# Patient Record
Sex: FEMALE | Race: WHITE | NOT HISPANIC OR LATINO
[De-identification: names, ages, dates, MRNs, and addresses within clinical notes are randomized per-mention and may not be internally consistent; named-entity substitution may affect disease eponyms.]

---

## 2019-03-26 PROBLEM — Z00.00 ENCOUNTER FOR PREVENTIVE HEALTH EXAMINATION: Status: ACTIVE | Noted: 2019-03-26

## 2019-05-07 ENCOUNTER — APPOINTMENT (OUTPATIENT)
Dept: PLASTIC SURGERY | Facility: CLINIC | Age: 54
End: 2019-05-07
Payer: MEDICAID

## 2019-05-07 VITALS
RESPIRATION RATE: 16 BRPM | BODY MASS INDEX: 21.62 KG/M2 | DIASTOLIC BLOOD PRESSURE: 80 MMHG | SYSTOLIC BLOOD PRESSURE: 127 MMHG | HEIGHT: 60.5 IN | HEART RATE: 89 BPM | WEIGHT: 113.03 LBS | OXYGEN SATURATION: 100 % | TEMPERATURE: 98.6 F

## 2019-05-07 PROCEDURE — XXXXX: CPT

## 2019-05-07 NOTE — PHYSICAL EXAM
[NI] : Normal [de-identified] : Abdomen soft, not distended, non tender, no masses, no diastasis. \par \par HB was present during exam.

## 2019-05-07 NOTE — HISTORY OF PRESENT ILLNESS
[FreeTextEntry1] : 52 y/o woman presents for initial consultation for an excess of skin on her lower abdomen at her  scar that she has had for many years. \par \par She c/o repeated bouts of infections and peeling, burning rashes underneath this skin flap that is exacerbated with increased activity, including exercising. \par She has tried to alleviate her symptoms with oral abx and cortisone cream and other topical treatments all throughout last summer. \par She reports her weight has been stable for over one year. \par \par Patient denies history of smoking. \par She is not on blood thinners.\par Occupation: office work \par She denies any heart/lung problems or previous blood clots. \par Surgical hx significant for two C-sections.

## 2019-05-07 NOTE — ADDENDUM
[FreeTextEntry1] : I, El Cowan, acted solely as a scribe for Dr. Wilber Cavanaugh on this date 5/7/19.

## 2019-09-25 ENCOUNTER — APPOINTMENT (OUTPATIENT)
Dept: PLASTIC SURGERY | Facility: CLINIC | Age: 54
End: 2019-09-25

## 2019-09-27 ENCOUNTER — OUTPATIENT (OUTPATIENT)
Dept: OUTPATIENT SERVICES | Facility: HOSPITAL | Age: 54
LOS: 1 days | End: 2019-09-27
Payer: SELF-PAY

## 2019-09-27 VITALS
SYSTOLIC BLOOD PRESSURE: 104 MMHG | DIASTOLIC BLOOD PRESSURE: 65 MMHG | HEIGHT: 61 IN | WEIGHT: 114.64 LBS | HEART RATE: 63 BPM | RESPIRATION RATE: 20 BRPM | TEMPERATURE: 98 F

## 2019-09-27 DIAGNOSIS — Z98.891 HISTORY OF UTERINE SCAR FROM PREVIOUS SURGERY: Chronic | ICD-10-CM

## 2019-09-27 DIAGNOSIS — Z29.9 ENCOUNTER FOR PROPHYLACTIC MEASURES, UNSPECIFIED: ICD-10-CM

## 2019-09-27 DIAGNOSIS — L90.5 SCAR CONDITIONS AND FIBROSIS OF SKIN: ICD-10-CM

## 2019-09-27 DIAGNOSIS — Z01.818 ENCOUNTER FOR OTHER PREPROCEDURAL EXAMINATION: ICD-10-CM

## 2019-09-27 LAB
ANION GAP SERPL CALC-SCNC: 11 MMOL/L — SIGNIFICANT CHANGE UP (ref 5–17)
BASOPHILS # BLD AUTO: 0.02 K/UL — SIGNIFICANT CHANGE UP (ref 0–0.2)
BASOPHILS NFR BLD AUTO: 0.6 % — SIGNIFICANT CHANGE UP (ref 0–2)
BLD GP AB SCN SERPL QL: SIGNIFICANT CHANGE UP
BUN SERPL-MCNC: 15 MG/DL — SIGNIFICANT CHANGE UP (ref 8–20)
CALCIUM SERPL-MCNC: 9.5 MG/DL — SIGNIFICANT CHANGE UP (ref 8.6–10.2)
CHLORIDE SERPL-SCNC: 103 MMOL/L — SIGNIFICANT CHANGE UP (ref 98–107)
CO2 SERPL-SCNC: 24 MMOL/L — SIGNIFICANT CHANGE UP (ref 22–29)
CREAT SERPL-MCNC: 0.57 MG/DL — SIGNIFICANT CHANGE UP (ref 0.5–1.3)
EOSINOPHIL # BLD AUTO: 0.07 K/UL — SIGNIFICANT CHANGE UP (ref 0–0.5)
EOSINOPHIL NFR BLD AUTO: 2.1 % — SIGNIFICANT CHANGE UP (ref 0–6)
GLUCOSE SERPL-MCNC: 97 MG/DL — SIGNIFICANT CHANGE UP (ref 70–115)
HCT VFR BLD CALC: 38 % — SIGNIFICANT CHANGE UP (ref 34.5–45)
HGB BLD-MCNC: 12.7 G/DL — SIGNIFICANT CHANGE UP (ref 11.5–15.5)
IMM GRANULOCYTES NFR BLD AUTO: 0.3 % — SIGNIFICANT CHANGE UP (ref 0–1.5)
LYMPHOCYTES # BLD AUTO: 1.65 K/UL — SIGNIFICANT CHANGE UP (ref 1–3.3)
LYMPHOCYTES # BLD AUTO: 50 % — HIGH (ref 13–44)
MCHC RBC-ENTMCNC: 31.8 PG — SIGNIFICANT CHANGE UP (ref 27–34)
MCHC RBC-ENTMCNC: 33.4 GM/DL — SIGNIFICANT CHANGE UP (ref 32–36)
MCV RBC AUTO: 95 FL — SIGNIFICANT CHANGE UP (ref 80–100)
MONOCYTES # BLD AUTO: 0.31 K/UL — SIGNIFICANT CHANGE UP (ref 0–0.9)
MONOCYTES NFR BLD AUTO: 9.4 % — SIGNIFICANT CHANGE UP (ref 2–14)
NEUTROPHILS # BLD AUTO: 1.24 K/UL — LOW (ref 1.8–7.4)
NEUTROPHILS NFR BLD AUTO: 37.6 % — LOW (ref 43–77)
PLATELET # BLD AUTO: 209 K/UL — SIGNIFICANT CHANGE UP (ref 150–400)
POTASSIUM SERPL-MCNC: 4.4 MMOL/L — SIGNIFICANT CHANGE UP (ref 3.5–5.3)
POTASSIUM SERPL-SCNC: 4.4 MMOL/L — SIGNIFICANT CHANGE UP (ref 3.5–5.3)
RBC # BLD: 4 M/UL — SIGNIFICANT CHANGE UP (ref 3.8–5.2)
RBC # FLD: 12.4 % — SIGNIFICANT CHANGE UP (ref 10.3–14.5)
SODIUM SERPL-SCNC: 138 MMOL/L — SIGNIFICANT CHANGE UP (ref 135–145)
WBC # BLD: 3.3 K/UL — LOW (ref 3.8–10.5)
WBC # FLD AUTO: 3.3 K/UL — LOW (ref 3.8–10.5)

## 2019-09-27 PROCEDURE — 93005 ELECTROCARDIOGRAM TRACING: CPT

## 2019-09-27 PROCEDURE — G0463: CPT

## 2019-09-27 PROCEDURE — 93010 ELECTROCARDIOGRAM REPORT: CPT

## 2019-09-27 RX ORDER — CEFAZOLIN SODIUM 1 G
2000 VIAL (EA) INJECTION ONCE
Refills: 0 | Status: DISCONTINUED | OUTPATIENT
Start: 2019-10-11 | End: 2019-10-28

## 2019-09-27 RX ORDER — SODIUM CHLORIDE 9 MG/ML
3 INJECTION INTRAMUSCULAR; INTRAVENOUS; SUBCUTANEOUS ONCE
Refills: 0 | Status: DISCONTINUED | OUTPATIENT
Start: 2019-10-11 | End: 2019-10-28

## 2019-09-27 NOTE — H&P PST ADULT - NSANTHOSAYNRD_GEN_A_CORE
No. LUKE screening performed.  STOP BANG Legend: 0-2 = LOW Risk; 3-4 = INTERMEDIATE Risk; 5-8 = HIGH Risk

## 2019-09-27 NOTE — H&P PST ADULT - ASSESSMENT
53 y/o female  with no past medical hx seen today pre-op for excision of  section scar with liposuction for scar condition and fibrosis of skin. Pt report intermittent itching to site due to diaphoresis to site. Pt denies pain and discomfort to  site. Surgery protocol reviewed with pt today.  SHADII VTE 2.0 SCORE [CLOT updated 2019]    AGE RELATED RISK FACTORS                                                       MOBILITY RELATED FACTORS  [x ] Age 41-60 years                                            (1 Point)                    [ ] Bed rest                                                        (1 Point)  [ ] Age: 61-74 years                                           (2 Points)                  [ ] Plaster cast                                                   (2 Points)  [ ] Age= 75 years                                              (3 Points)                    [ ] Bed bound for more than 72 hours                 (2 Points)    DISEASE RELATED RISK FACTORS                                               GENDER SPECIFIC FACTORS  [ ] Edema in the lower extremities                       (1 Point)              [ ] Pregnancy                                                     (1 Point)  [ ] Varicose veins                                               (1 Point)                     [ ] Post-partum < 6 weeks                                   (1 Point)             [ ] BMI > 25 Kg/m2                                            (1 Point)                     [ ] Hormonal therapy  or oral contraception          (1 Point)                 [ ] Sepsis (in the previous month)                        (1 Point)               [ ] History of pregnancy complications                 (1 point)  [ ] Pneumonia or serious lung disease                                               [ ] Unexplained or recurrent                     (1 Point)           (in the previous month)                               (1 Point)  [ ] Abnormal pulmonary function test                     (1 Point)                 SURGERY RELATED RISK FACTORS  [ ] Acute myocardial infarction                              (1 Point)               [ ]  Section                                             (1 Point)  [ ] Congestive heart failure (in the previous month)  (1 Point)      [ ] Minor surgery                                                  (1 Point)   [ ] Inflammatory bowel disease                             (1 Point)               [ ] Arthroscopic surgery                                        (2 Points)  [ ] Central venous access                                      (2 Points)                [x ] General surgery lasting more than 45 minutes (2 points)  [ ] Malignancy- Present or previous                   (2 Points)                [ ] Elective arthroplasty                                         (5 points)    [ ] Stroke (in the previous month)                          (5 Points)                                                                                                                                                           HEMATOLOGY RELATED FACTORS                                                 TRAUMA RELATED RISK FACTORS  [ ] Prior episodes of VTE                                     (3 Points)                [ ] Fracture of the hip, pelvis, or leg                       (5 Points)  [ ] Positive family history for VTE                         (3 Points)             [ ] Acute spinal cord injury (in the previous month)  (5 Points)  [ ] Prothrombin 60756 A                                     (3 Points)               [ ] Paralysis  (less than 1 month)                             (5 Points)  [ ] Factor V Leiden                                             (3 Points)                  [ ] Multiple Trauma within 1 month                        (5 Points)  [ ] Lupus anticoagulants                                     (3 Points)                                                           [ ] Anticardiolipin antibodies                               (3 Points)                                                       [ ] High homocysteine in the blood                      (3 Points)                                             [ ] Other congenital or acquired thrombophilia      (3 Points)                                                [ ] Heparin induced thrombocytopenia                  (3 Points)                                     Total Score [   3       ]  OPIOID RISK TOOL    ELIZABETH EACH BOX THAT APPLIES AND ADD TOTALS AT THE END    FAMILY HISTORY OF SUBSTANCE ABUSE                 FEMALE         MALE                                                Alcohol                             [  ]1 pt          [  ]3pts                                               Illegal Durgs                     [  ]2 pts        [  ]3pts                                               Rx Drugs                           [  ]4 pts        [  ]4 pts    PERSONAL HISTORY OF SUBSTANCE ABUSE                                                                                          Alcohol                             [  ]3 pts       [  ]3 pts                                               Illegal Drugs                     [  ]4 pts        [  ]4 pts                                               Rx Drugs                           [  ]5 pts        [  ]5 pts    AGE BETWEEN 16-45 YEARS                                      [  ]1 pt         [  ]1 pt    HISTORY OF PREADOLESCENT   SEXUAL ABUSE                                                             [  ]3 pts        [  ]0pts    PSYCHOLOGICAL DISEASE                     ADD, OCD, Bipolar, Schizophrenia        [  ]2 pts         [  ]2 pts                      Depression                                               [  ]1 pt           [  ]1 pt           SCORING TOTAL   (add numbers and type here)              (**0*)                                     A score of 3 or lower indicated LOW risk for future opioid abuse  A score of 4 to 7 indicated moderate risk for future opioid abuse  A score of 8 or higher indicates a high risk for opioid abuse

## 2019-09-27 NOTE — H&P PST ADULT - HISTORY OF PRESENT ILLNESS
53 y/o female  with no past medical hx seen today pre-op for excision of  section scar with liposuction for scar condition and fibrosis of skin. Pt report intermittent itching to site due to diaphoresis to site. Pt denies pain and discomfort to  surgical site. Seen today for scheduled surgery.

## 2019-09-27 NOTE — H&P PST ADULT - NSICDXPROBLEM_GEN_ALL_CORE_FT
PROBLEM DIAGNOSES  Problem: Scar condition and fibrosis of skin  Assessment and Plan: Excision of  section scar with liposuction     Problem: Need for prophylactic measure  Assessment and Plan: Moderate risk, surgical team to determine prophylactic intervention

## 2019-10-10 ENCOUNTER — TRANSCRIPTION ENCOUNTER (OUTPATIENT)
Age: 54
End: 2019-10-10

## 2019-10-11 ENCOUNTER — OUTPATIENT (OUTPATIENT)
Dept: OUTPATIENT SERVICES | Facility: HOSPITAL | Age: 54
LOS: 1 days | End: 2019-10-11
Payer: SELF-PAY

## 2019-10-11 VITALS
OXYGEN SATURATION: 100 % | SYSTOLIC BLOOD PRESSURE: 106 MMHG | HEART RATE: 78 BPM | RESPIRATION RATE: 18 BRPM | DIASTOLIC BLOOD PRESSURE: 55 MMHG | TEMPERATURE: 97 F

## 2019-10-11 VITALS
OXYGEN SATURATION: 99 % | TEMPERATURE: 98 F | RESPIRATION RATE: 16 BRPM | SYSTOLIC BLOOD PRESSURE: 112 MMHG | WEIGHT: 114.64 LBS | DIASTOLIC BLOOD PRESSURE: 76 MMHG | HEART RATE: 70 BPM | HEIGHT: 61 IN

## 2019-10-11 DIAGNOSIS — L90.5 SCAR CONDITIONS AND FIBROSIS OF SKIN: ICD-10-CM

## 2019-10-11 DIAGNOSIS — Z98.891 HISTORY OF UTERINE SCAR FROM PREVIOUS SURGERY: Chronic | ICD-10-CM

## 2019-10-11 PROCEDURE — 13101 CMPLX RPR TRUNK 2.6-7.5 CM: CPT | Mod: XS

## 2019-10-11 PROCEDURE — 14302 TIS TRNFR ADDL 30 SQ CM: CPT

## 2019-10-11 PROCEDURE — 15877 SUCTION LIPECTOMY TRUNK: CPT

## 2019-10-11 PROCEDURE — 14301 TIS TRNFR ANY 30.1-60 SQ CM: CPT

## 2019-10-11 PROCEDURE — 13102 CMPLX RPR TRUNK ADDL 5CM/<: CPT | Mod: XS

## 2019-10-11 RX ORDER — SCOPALAMINE 1 MG/3D
1 PATCH, EXTENDED RELEASE TRANSDERMAL ONCE
Refills: 0 | Status: COMPLETED | OUTPATIENT
Start: 2019-10-11 | End: 2019-10-11

## 2019-10-11 RX ORDER — CEPHALEXIN 500 MG
1 CAPSULE ORAL
Qty: 8 | Refills: 0
Start: 2019-10-11 | End: 2019-10-12

## 2019-10-11 RX ORDER — PROGESTERONE 200 MG/1
0 CAPSULE, LIQUID FILLED ORAL
Qty: 0 | Refills: 0 | DISCHARGE

## 2019-10-11 RX ORDER — KETOROLAC TROMETHAMINE 30 MG/ML
30 SYRINGE (ML) INJECTION ONCE
Refills: 0 | Status: DISCONTINUED | OUTPATIENT
Start: 2019-10-11 | End: 2019-10-11

## 2019-10-11 RX ADMIN — SCOPALAMINE 1 PATCH: 1 PATCH, EXTENDED RELEASE TRANSDERMAL at 15:03

## 2019-10-11 RX ADMIN — Medication 30 MILLIGRAM(S): at 18:11

## 2019-10-11 NOTE — ASU DISCHARGE PLAN (ADULT/PEDIATRIC) - CALL YOUR DOCTOR IF YOU HAVE ANY OF THE FOLLOWING:
Wound/Surgical Site with redness, or foul smelling discharge or pus/Swelling that gets worse/Fever greater than (need to indicate Fahrenheit or Celsius)/Bleeding that does not stop/Nausea and vomiting that does not stop/Pain not relieved by Medications/Numbness, tingling, color or temperature change to extremity

## 2019-10-11 NOTE — ASU DISCHARGE PLAN (ADULT/PEDIATRIC) - CARE PROVIDER_API CALL
Wilber Cavanaugh)  Plastic Surgery; Surgery; Surgery of the Hand  [address], [address]  [place] [address]  Phone: [phone]  Fax: [phone]  Follow Up Time: 2 weeks

## 2019-10-14 PROBLEM — L90.5 SCAR CONDITIONS AND FIBROSIS OF SKIN: Chronic | Status: ACTIVE | Noted: 2019-09-27

## 2019-10-29 ENCOUNTER — APPOINTMENT (OUTPATIENT)
Dept: PLASTIC SURGERY | Facility: CLINIC | Age: 54
End: 2019-10-29
Payer: MEDICAID

## 2019-10-29 VITALS
BODY MASS INDEX: 22.58 KG/M2 | HEART RATE: 81 BPM | RESPIRATION RATE: 16 BRPM | TEMPERATURE: 97.5 F | HEIGHT: 60 IN | SYSTOLIC BLOOD PRESSURE: 124 MMHG | OXYGEN SATURATION: 100 % | WEIGHT: 115 LBS | DIASTOLIC BLOOD PRESSURE: 72 MMHG

## 2019-10-29 DIAGNOSIS — L90.5 SCAR CONDITIONS AND FIBROSIS OF SKIN: ICD-10-CM

## 2019-10-29 DIAGNOSIS — Z98.890 OTHER SPECIFIED POSTPROCEDURAL STATES: ICD-10-CM

## 2019-10-29 PROCEDURE — 99024 POSTOP FOLLOW-UP VISIT: CPT

## 2019-12-03 ENCOUNTER — APPOINTMENT (OUTPATIENT)
Dept: PLASTIC SURGERY | Facility: CLINIC | Age: 54
End: 2019-12-03

## 2020-03-29 NOTE — PHYSICAL EXAM
[Normal Breath Sounds] : Normal breath sounds [JVD] : no jugular venous distention  [Purpura] : no purpura  [de-identified] : well nourished [de-identified] : SUSHMA MUNSON [de-identified] : even and unlabored, no distress [de-identified] : MORALEZ, no edema [de-identified] : incisions healing well, tape intact

## 2020-10-21 ENCOUNTER — APPOINTMENT (OUTPATIENT)
Dept: RHEUMATOLOGY | Facility: CLINIC | Age: 55
End: 2020-10-21
Payer: MEDICAID

## 2020-10-21 VITALS
TEMPERATURE: 98.1 F | SYSTOLIC BLOOD PRESSURE: 95 MMHG | HEART RATE: 66 BPM | HEIGHT: 60 IN | OXYGEN SATURATION: 100 % | WEIGHT: 110 LBS | BODY MASS INDEX: 21.6 KG/M2 | DIASTOLIC BLOOD PRESSURE: 60 MMHG

## 2020-10-21 DIAGNOSIS — Z78.9 OTHER SPECIFIED HEALTH STATUS: ICD-10-CM

## 2020-10-21 DIAGNOSIS — Z80.3 FAMILY HISTORY OF MALIGNANT NEOPLASM OF BREAST: ICD-10-CM

## 2020-10-21 DIAGNOSIS — M19.249 SECONDARY OSTEOARTHRITIS, UNSPECIFIED HAND: ICD-10-CM

## 2020-10-21 PROCEDURE — 99072 ADDL SUPL MATRL&STAF TM PHE: CPT

## 2020-10-21 PROCEDURE — 36415 COLL VENOUS BLD VENIPUNCTURE: CPT

## 2020-10-21 PROCEDURE — 99204 OFFICE O/P NEW MOD 45 MIN: CPT | Mod: 25

## 2020-10-21 NOTE — REVIEW OF SYSTEMS
[Fever] : no fever [Chills] : no chills [Eye Pain] : no eye pain [Red Eyes] : eyes not red [Chest Pain] : no chest pain [Cough] : no cough [SOB on Exertion] : no shortness of breath during exertion [Diarrhea] : no diarrhea [Melena] : no melena [As Noted in HPI] : as noted in HPI [Joint Pain] : joint pain [Joint Stiffness] : joint stiffness [Skin Lesions] : no skin lesions [Skin Wound] : no skin wound

## 2020-10-21 NOTE — HISTORY OF PRESENT ILLNESS
[FreeTextEntry1] : 54 yo woman with no past medical issues present for evaluation of hand pain.  Patient has notice swelling over the right th pip for now 1 year.  pain is severe.  sh eis unable to open bottles due max epain and swelling.  she has started to notice swelling over the 1st digit 6 months ago.  she senses burning as well.  PAtient was seen by [facility] rheum and was told it was OA.  patient has tried Naprioxen which takes the edge off.  she only uses in actute pain.  No FH history of hand with herben or Cierra nodes.  patient has been told that she has OA in spine with some long standing back pain.  back pain not debilitating.  present in the morning and after stretching.  no back pain while active.  this back [pain has been present since befor the age of 41.  \par \par Patient patient any psoriasis , FH prosiasis,.  No IBD s/s and no FH of crhons or UC.  pateint denies an history of STDs.  No issues with achilles or plantar foot.  NO other joint issue stat the present time.  \par \par morning stiffness of the hands for about 1 hour.  No other joint stiffness

## 2020-10-21 NOTE — PHYSICAL EXAM
[General Appearance - Well Nourished] : well nourished [General Appearance - Well Developed] : well developed [Sclera] : the sclera and conjunctiva were normal [Hearing Threshold Finger Rub Not [place]] : hearing was normal [Neck Appearance] : the appearance of the neck was normal [Auscultation Breath Sounds / Voice Sounds] : lungs were clear to auscultation bilaterally [Heart Sounds] : normal S1 and S2 [Nail Clubbing] : no clubbing  or cyanosis of the fingernails [Motor Tone] : muscle strength and tone were normal [FreeTextEntry1] : patint with herben and conrado node changes of b/l hands, she has swellingover the right 2nd pip and 1 pip which are tender to palpation and restricts ROM, she has left 2nd dip tenderness  [] : no rash [Skin Lesions] : no skin lesions

## 2020-10-21 NOTE — ASSESSMENT
[FreeTextEntry1] : 56 yo woman with history of hand pain with herben and conrado changes but also with swelling involving right 2nd PIP and 1st PIP \par \par --patient is to have xray of all her fingers looking for possible erosive OA \par --will start voltaren gel \par --will check HLA b27 and inflammatory marker as on occasion PSA can look like OA on xray ) initially )\par --will sent tO pT \par

## 2020-10-21 NOTE — DATA REVIEWED
[FreeTextEntry1] : right second digit xray: mild OA with 9 mm ovoid distal aspect right second PIP suggestive of a cyst

## 2020-11-13 LAB
CCP AB SER IA-ACNC: <8 UNITS
CRP SERPL-MCNC: <0.1 MG/DL
ERYTHROCYTE [SEDIMENTATION RATE] IN BLOOD BY WESTERGREN METHOD: 2 MM/HR
HLA-B27 RELATED AG QL: NORMAL
RF+CCP IGG SER-IMP: NEGATIVE
RHEUMATOID FACT SER QL: 12 IU/ML

## 2020-12-23 ENCOUNTER — APPOINTMENT (OUTPATIENT)
Dept: RHEUMATOLOGY | Facility: CLINIC | Age: 55
End: 2020-12-23
Payer: MEDICAID

## 2020-12-23 VITALS
HEART RATE: 64 BPM | HEIGHT: 60 IN | WEIGHT: 110 LBS | BODY MASS INDEX: 21.6 KG/M2 | TEMPERATURE: 98.2 F | OXYGEN SATURATION: 100 % | SYSTOLIC BLOOD PRESSURE: 106 MMHG | DIASTOLIC BLOOD PRESSURE: 66 MMHG

## 2020-12-23 DIAGNOSIS — M17.5 OTHER UNILATERAL SECONDARY OSTEOARTHRITIS OF KNEE: ICD-10-CM

## 2020-12-23 PROCEDURE — 99214 OFFICE O/P EST MOD 30 MIN: CPT

## 2020-12-23 PROCEDURE — 99072 ADDL SUPL MATRL&STAF TM PHE: CPT

## 2021-02-03 ENCOUNTER — APPOINTMENT (OUTPATIENT)
Dept: RHEUMATOLOGY | Facility: CLINIC | Age: 56
End: 2021-02-03
Payer: MEDICAID

## 2021-02-03 VITALS
SYSTOLIC BLOOD PRESSURE: 124 MMHG | WEIGHT: 110 LBS | BODY MASS INDEX: 21.6 KG/M2 | OXYGEN SATURATION: 100 % | HEART RATE: 74 BPM | HEIGHT: 60 IN | DIASTOLIC BLOOD PRESSURE: 79 MMHG | TEMPERATURE: 97 F

## 2021-02-03 DIAGNOSIS — M19.049 PRIMARY OSTEOARTHRITIS, UNSPECIFIED HAND: ICD-10-CM

## 2021-02-03 PROCEDURE — 99214 OFFICE O/P EST MOD 30 MIN: CPT

## 2021-02-03 PROCEDURE — 99072 ADDL SUPL MATRL&STAF TM PHE: CPT

## 2021-02-03 RX ORDER — PREDNISONE 20 MG/1
20 TABLET ORAL
Qty: 15 | Refills: 0 | Status: DISCONTINUED | COMMUNITY
Start: 2020-12-23 | End: 2021-02-03

## 2021-02-03 RX ORDER — NAPROXEN 375 MG/1
375 TABLET ORAL
Refills: 0 | Status: DISCONTINUED | COMMUNITY
End: 2021-02-03

## 2021-02-03 NOTE — PHYSICAL EXAM
[General Appearance - Well Nourished] : well nourished [General Appearance - Well Developed] : well developed [Sclera] : the sclera and conjunctiva were normal [Hearing Threshold Finger Rub Not [place]] : hearing was normal [Nail Clubbing] : no clubbing  or cyanosis of the fingernails [Motor Tone] : muscle strength and tone were normal [] : no rash [Skin Lesions] : no skin lesions [Affect] : the affect was normal [Mood] : the mood was normal [FreeTextEntry1] : patientr with right 1st IP and 2PIP swelling and tenderness.  she also has trigger finger of the 4h right finger.

## 2021-02-03 NOTE — HISTORY OF PRESENT ILLNESS
[FreeTextEntry1] : 54 yo woman with no past medical issues present for evaluation of hand pain.  Patient has notice swelling over the right th pip for now 1 year.  pain is severe.  sh eis unable to open bottles due max epain and swelling.  she has started to notice swelling over the 1st digit 6 months ago.  she senses burning as well.  PAtient was seen by [facility] rheum and was told it was OA.  patient has tried Naprioxen which takes the edge off.  she only uses in actute pain.  No FH history of hand with herben or Cierra nodes.  patient has been told that she has OA in spine with some long standing back pain.  back pain not debilitating.  present in the morning and after stretching.  no back pain while active.  this back [pain has been present since befor the age of 41.  \par \par Patient patient any psoriasis , FH psoriasis,.  No IBD s/s and no FH of Crohns or UC.  pateint denies an history of STDs.  No issues with Achilles or plantar foot.  NO other joint issue stat the present time.  \par \par morning stiffness of the hands for about 1 hour.  No other joint stiffness \par \par last visit : patient is noted to have negative CCP/RF/HLA-b27 and normal inflmatory markers.  Patient has been on naproxen with ni epian relieve.  however she is unable to use her right thumb due to IP swelling , she also has swelling of the 2nd and 3th right PIPs.  Patient is having difficult time opening and using the mouse  \par \par today: patient tried prednisone with no response.  she started Napraxen 500 daily with Plaquenil 400 with 50% improvement in finger pain. sdhe is no longer waking up with stabbing finger pain.  she started PT and is doing home PT.  she is also using heat mittens for therapy.  she has notice trigger finger of the right 4th digit.  she has little flexion of the 2nd pip and swelling over the area.    \par \par

## 2021-02-03 NOTE — PHYSICAL EXAM
[General Appearance - Well Nourished] : well nourished [General Appearance - Well Developed] : well developed [Sclera] : the sclera and conjunctiva were normal [Hearing Threshold Finger Rub Not [place]] : hearing was normal [Neck Cervical Mass (___cm)] : no neck mass was observed [Nail Clubbing] : no clubbing  or cyanosis of the fingernails [Motor Tone] : muscle strength and tone were normal [] : no rash [Skin Lesions] : no skin lesions [Affect] : the affect was normal [Mood] : the mood was normal [FreeTextEntry1] : swelling and tenderness over the right 1st IP and 2nd and 3th PIPs

## 2021-02-03 NOTE — REVIEW OF SYSTEMS
[As Noted in HPI] : as noted in HPI [Fever] : no fever [Chills] : no chills [Eye Pain] : no eye pain [Nosebleeds] : no nosebleeds [Chest Pain] : no chest pain [Cough] : no cough [SOB on Exertion] : no shortness of breath during exertion [Diarrhea] : no diarrhea [Skin Lesions] : no skin lesions [Skin Wound] : no skin wound

## 2021-02-03 NOTE — REVIEW OF SYSTEMS
[As Noted in HPI] : as noted in HPI [Fever] : no fever [Chills] : no chills [Eye Pain] : no eye pain [Nosebleeds] : no nosebleeds [Chest Pain] : no chest pain [Palpitations] : no palpitations [Cough] : no cough [SOB on Exertion] : no shortness of breath during exertion [Diarrhea] : no diarrhea [Skin Lesions] : no skin lesions [Skin Wound] : no skin wound

## 2021-02-03 NOTE — HISTORY OF PRESENT ILLNESS
[FreeTextEntry1] : 56 yo woman with no past medical issues present for evaluation of hand pain.  Patient has notice swelling over the right th pip for now 1 year.  pain is severe.  sh eis unable to open bottles due max epain and swelling.  she has started to notice swelling over the 1st digit 6 months ago.  she senses burning as well.  PAtient was seen by [facility] rheum and was told it was OA.  patient has tried Naprioxen which takes the edge off.  she only uses in actute pain.  No FH history of hand with herben or Cierra nodes.  patient has been told that she has OA in spine with some long standing back pain.  back pain not debilitating.  present in the morning and after stretching.  no back pain while active.  this back [pain has been present since befor the age of 41.  \par \par Patient patient any psoriasis , FH psoriasis,.  No IBD s/s and no FH of Crohns or UC.  pateint denies an history of STDs.  No issues with Achilles or plantar foot.  NO other joint issue stat the present time.  \par \par morning stiffness of the hands for about 1 hour.  No other joint stiffness \par \par last visit : patient is noted to have negative CCP/RF/HLA-b27 and normal inflmatory markers.  Patient has been on naproxen with ni epian relieve.  however she is unable to use her right thumb due to IP swelling , she also has swelling of the 2nd and 3th right PIPs.  Patient is having difficult time opening and using the mouse  \par \par today: patient tried prednisone with no response.  she started Napraxen 500 daily with Plaquenil 400 with 50% improvement in finger pain. sdhe is no longer waking up with stabbing finger pain.  she started PT and is doing home PT.  she is also using heat mittens for therapy.  she has notice trigger finger of the right 4th digit.  she has to flex the 2nd pip and has swelling over the area.    \par \par

## 2021-02-03 NOTE — ASSESSMENT
[FreeTextEntry1] : 54 yo with DJD of the hands with clinically some inflammatory component but xray not suggestive of erosive DIP OA.  started on Plaquenil and naproxen with 50% improvement in pain \par \par --will cont Plaquenil and naproxen\par --she is to see hand surgery for trigger finger and evaluation \par --she sees eye clinic once a year.  seen about 1 month ago\par --still too early to see full effect of plaquneil.. will cont for a few months and monitor  closely \par --drug monitoring labs \par

## 2021-02-03 NOTE — ASSESSMENT
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  Xray does not show erosion to suggest erosive OA however it seem to be behaving like erosive OA  \par \par --will give a trail of prednisone \par --start Plaquenil \par --Naproxen for pain \par

## 2021-02-10 ENCOUNTER — APPOINTMENT (OUTPATIENT)
Dept: ORTHOPEDIC SURGERY | Facility: CLINIC | Age: 56
End: 2021-02-10

## 2021-03-03 ENCOUNTER — APPOINTMENT (OUTPATIENT)
Dept: ORTHOPEDIC SURGERY | Facility: CLINIC | Age: 56
End: 2021-03-03
Payer: MEDICAID

## 2021-03-03 VITALS
HEIGHT: 60 IN | SYSTOLIC BLOOD PRESSURE: 91 MMHG | BODY MASS INDEX: 21.6 KG/M2 | HEART RATE: 86 BPM | DIASTOLIC BLOOD PRESSURE: 59 MMHG | WEIGHT: 110 LBS

## 2021-03-03 DIAGNOSIS — M19.042 PRIMARY OSTEOARTHRITIS, LEFT HAND: ICD-10-CM

## 2021-03-03 DIAGNOSIS — M19.041 PRIMARY OSTEOARTHRITIS, RIGHT HAND: ICD-10-CM

## 2021-03-03 PROCEDURE — 20600 DRAIN/INJ JOINT/BURSA W/O US: CPT | Mod: 59,RT

## 2021-03-03 PROCEDURE — 99072 ADDL SUPL MATRL&STAF TM PHE: CPT

## 2021-03-03 PROCEDURE — 99204 OFFICE O/P NEW MOD 45 MIN: CPT | Mod: 25

## 2021-03-03 RX ORDER — METHYLPREDNISOLONE ACETATE 40 MG/ML
40 INJECTION, SUSPENSION INTRA-ARTICULAR; INTRALESIONAL; INTRAMUSCULAR; SOFT TISSUE
Qty: 0.5 | Refills: 0 | Status: ACTIVE | COMMUNITY
Start: 2021-03-03

## 2021-05-12 ENCOUNTER — APPOINTMENT (OUTPATIENT)
Dept: RHEUMATOLOGY | Facility: CLINIC | Age: 56
End: 2021-05-12
Payer: MEDICAID

## 2021-05-12 VITALS
SYSTOLIC BLOOD PRESSURE: 126 MMHG | WEIGHT: 115 LBS | HEART RATE: 70 BPM | OXYGEN SATURATION: 100 % | HEIGHT: 60 IN | BODY MASS INDEX: 22.58 KG/M2 | DIASTOLIC BLOOD PRESSURE: 87 MMHG

## 2021-05-12 PROCEDURE — 99072 ADDL SUPL MATRL&STAF TM PHE: CPT

## 2021-05-12 PROCEDURE — 99214 OFFICE O/P EST MOD 30 MIN: CPT

## 2021-05-12 NOTE — REVIEW OF SYSTEMS
[Fever] : no fever [Chills] : no chills [Eye Pain] : no eye pain [Red Eyes] : eyes not red [Nosebleeds] : no nosebleeds [Chest Pain] : no chest pain [Palpitations] : no palpitations [Cough] : no cough [SOB on Exertion] : no shortness of breath during exertion [Diarrhea] : no diarrhea

## 2021-05-12 NOTE — ASSESSMENT
[FreeTextEntry1] : 56 yo woman with hand OA \par \par --patient to start cymbalta \par --cont naproxen. added omeprozole  \par --see ortho for injections as needed \par --paraffin as needed for pain \par --encourage PT as tolerated to avoid mm atropy of the hand \par \par \par \par

## 2021-05-12 NOTE — PHYSICAL EXAM
[General Appearance - Well Nourished] : well nourished [General Appearance - Well Developed] : well developed [Sclera] : the sclera and conjunctiva were normal [Hearing Threshold Finger Rub Not [place]] : hearing was normal [Nail Clubbing] : no clubbing  or cyanosis of the fingernails [Musculoskeletal - Swelling] : no joint swelling seen [Motor Tone] : muscle strength and tone were normal [FreeTextEntry1] : patient herben nodes and conrado nodes , squaring the 1st cmc  [] : no rash [Skin Lesions] : no skin lesions [Affect] : the affect was normal [Mood] : the mood was normal

## 2021-05-12 NOTE — HISTORY OF PRESENT ILLNESS
[FreeTextEntry1] : 54 yo woman with no past medical issues present for evaluation of hand pain.  Patient has notice swelling over the righ t4 th pip for now 1 year.  pain is severe.  she is unable to open bottles due to the pain and swelling.  she has started to notice swelling over the 1st digit 6 months ago.  she senses burning as well.  PAtient was seen by [facility] rheum and was told it was OA.  patient has tried Naproxen which takes the edge off.  she only uses in acute pain.  her grandmother apparently had herben or Cierra nodes.  \par \par Patient denies any psoriasis , FH psoriasis,.  No IBD s/s and no FH of Crohns or UC.  patient denies an history of STDs.  No issues with Achilles or plantar foot.  NO other joint issue stat the present time.  \par \par morning stiffness of the hands for about 1 hour.  No other joint stiffness \par \par last visit : patient is noted to have negative CCP/RF/HLA-b27 and normal inflammatory markers.  Patient has been on naproxen with some pian relieve.  however she is unable to use her right thumb due to IP swelling , she also has swelling of the 2nd and 3th right PIPs.  Patient is having difficult time opening and using the mouse  \par \par today: patient has tried prednisone and plaquenil with no improvement in hand pain.  she was seen by hand surgery and received injection to her 1st IP and 4th pip with ni epain relief.  she has continues naproxen BID with also some pain relief. diclofenac gel does not seem to do much.  she does not tolerate PT very well due to exaccerbation of pain.  she tries heat ( paraffin ) with some pain relief.   \par \par

## 2021-07-07 ENCOUNTER — APPOINTMENT (OUTPATIENT)
Dept: RHEUMATOLOGY | Facility: CLINIC | Age: 56
End: 2021-07-07
Payer: MEDICAID

## 2021-07-07 VITALS
HEART RATE: 63 BPM | WEIGHT: 110 LBS | SYSTOLIC BLOOD PRESSURE: 108 MMHG | DIASTOLIC BLOOD PRESSURE: 73 MMHG | HEIGHT: 60 IN | BODY MASS INDEX: 21.6 KG/M2 | OXYGEN SATURATION: 99 %

## 2021-07-07 PROCEDURE — 99214 OFFICE O/P EST MOD 30 MIN: CPT

## 2021-07-07 RX ORDER — HYDROXYCHLOROQUINE SULFATE 200 MG/1
200 TABLET, FILM COATED ORAL
Qty: 60 | Refills: 3 | Status: DISCONTINUED | COMMUNITY
Start: 2020-12-23 | End: 2021-07-07

## 2021-07-07 RX ORDER — DICLOFENAC SODIUM 10 MG/G
1 GEL TOPICAL DAILY
Qty: 1 | Refills: 3 | Status: DISCONTINUED | COMMUNITY
Start: 2020-10-21 | End: 2021-07-07

## 2021-07-07 RX ORDER — DICLOFENAC SODIUM 1% 10 MG/G
1 GEL TOPICAL
Qty: 1 | Refills: 2 | Status: DISCONTINUED | COMMUNITY
Start: 2021-03-03 | End: 2021-07-07

## 2021-07-07 NOTE — PHYSICAL EXAM
[General Appearance - Well Nourished] : well nourished [General Appearance - Well Developed] : well developed [Sclera] : the sclera and conjunctiva were normal [Hearing Threshold Finger Rub Not [place]] : hearing was normal [Nail Clubbing] : no clubbing  or cyanosis of the fingernails [Motor Tone] : muscle strength and tone were normal [] : no rash [FreeTextEntry1] : right 1st IP swollen and tender, 2nd IP also tender and swollen.  i notice some mm atrophy of the right hand. left hand looks better than right . left hand nontender  [Skin Lesions] : no skin lesions [Affect] : the affect was normal [Mood] : the mood was normal

## 2021-07-07 NOTE — HISTORY OF PRESENT ILLNESS
[FreeTextEntry1] : 56 yo woman with hand OA ( DIP,IP, 1st cmc).  she has tried diclofenac gel, prednisone  and Plaquenil which did not work. she started cymbalta 30mg and she feels  like her pain is better.  she still requires naproxen multiple times a week.  she s taking omeprazole wiwth NSAIDS.  she tried OT however this exacerbated her pain and now has adversity to try it again. she saw hand surgery and had injections to her 1st IP and 4th pip with some pain relief.  \par \par Patient denies any psoriasis , FH psoriasis,.  No IBD s/s and no FH of Crohns or UC.  patient denies an history of STDs.  No issues with Achilles or plantar foot.  NO other joint issue stat the present time.  \par \par morning stiffness of the hands for about 1 hour.  No other joint stiffness \par \par  patient is noted to have negative CCP/RF/HLA-b27 and normal inflammatory markers.

## 2021-07-07 NOTE — REVIEW OF SYSTEMS
[Fever] : no fever [Chills] : no chills [Eye Pain] : no eye pain [Red Eyes] : eyes not red [Nosebleeds] : no nosebleeds [Nasal Discharge] : no nasal discharge [Chest Pain] : no chest pain [Palpitations] : no palpitations [Cough] : no cough [SOB on Exertion] : no shortness of breath during exertion [Diarrhea] : no diarrhea

## 2021-07-07 NOTE — ASSESSMENT
[FreeTextEntry1] : 56 yo woman with hand OA .  she has tried injections, diclofenac gel, plaquenil with little effect.  she tried PT and had exaccerbation of pain.  she is currently on cymbalta and doing better.  taking intermittent naproxen \par \par --cont cymbalta \par --naproxen PRN \par --omeprazole \par -home hand excercises  \par \par

## 2021-12-10 ENCOUNTER — RX RENEWAL (OUTPATIENT)
Age: 56
End: 2021-12-10

## 2022-02-02 ENCOUNTER — APPOINTMENT (OUTPATIENT)
Dept: RHEUMATOLOGY | Facility: CLINIC | Age: 57
End: 2022-02-02
Payer: MEDICAID

## 2022-02-02 VITALS
HEART RATE: 83 BPM | WEIGHT: 110 LBS | OXYGEN SATURATION: 98 % | HEIGHT: 60 IN | BODY MASS INDEX: 21.6 KG/M2 | DIASTOLIC BLOOD PRESSURE: 61 MMHG | SYSTOLIC BLOOD PRESSURE: 98 MMHG | RESPIRATION RATE: 18 BRPM

## 2022-02-02 PROCEDURE — 99214 OFFICE O/P EST MOD 30 MIN: CPT

## 2022-02-02 NOTE — PHYSICAL EXAM
[General Appearance - Well Nourished] : well nourished [General Appearance - Well Developed] : well developed [Sclera] : the sclera and conjunctiva were normal [Hearing Threshold Finger Rub Not [place]] : hearing was normal [Nail Clubbing] : no clubbing  or cyanosis of the fingernails [Motor Tone] : muscle strength and tone were normal [FreeTextEntry1] : patient with right 2nd pip pain and swelling and deformity, unable to bend at this pip, right 1st IP also tender to palpation swollen and unable to bent, 3th pip pain but no swelling, left 5th dip [place]ber[place] nodes.    [] : no rash [Skin Lesions] : no skin lesions [Affect] : the affect was normal [Mood] : the mood was normal

## 2022-02-02 NOTE — ASSESSMENT
[FreeTextEntry1] : 57 yo woman with hand OA with some inflammatory component.  we have tried prednisone, plaquenil , OT an with little improvement.  Injection with mild improvement.  we started Cymbalta with mild improvement \par \par \par --will increase Cymbalta to 60mg \par --she cont naproxen with omeprazole as needed \par --will consider getting new xrays on next visit \par --she is encouraged to do hand exercises\par --we have also discuss using MTX off label \par \par

## 2022-02-02 NOTE — HISTORY OF PRESENT ILLNESS
[FreeTextEntry1] : 56 yo woman with hand OA ( DIP,IP, 1st cmc).  she has tried diclofenac gel, prednisone  and Plaquenil which did not work. she started cymbalta 30mg and she feels  like her pain is better.  she still requires naproxen multiple times a week.  she s taking omeprazole with NSAIDS.  she tried OT however this exacerbated her pain and now has adversity to try it again. she saw hand surgery and had injections to her 1st IP and 4th pip with some pain relief.  \par \par Patient denies any psoriasis , FH psoriasis,.  No IBD s/s and no FH of Crohns or UC.  patient denies an history of STDs.  No issues with Achilles or plantar foot.  NO other joint issue stat the present time.  \par \par morning stiffness of the hands for about 1 hour.  No other joint stiffness \par \par  patient is noted to have negative CCP/RF/HLA-b27 and normal inflammatory markers.

## 2022-07-08 NOTE — H&P PST ADULT - NSICDXFAMILYHX_GEN_ALL_CORE_FT
FAMILY HISTORY:  FH: type 2 diabetes mellitus Azathioprine Counseling:  I discussed with the patient the risks of azathioprine including but not limited to myelosuppression, immunosuppression, hepatotoxicity, lymphoma, and infections.  The patient understands that monitoring is required including baseline LFTs, Creatinine, possible TPMP genotyping and weekly CBCs for the first month and then every 2 weeks thereafter.  The patient verbalized understanding of the proper use and possible adverse effects of azathioprine.  All of the patient's questions and concerns were addressed.

## 2022-07-20 ENCOUNTER — APPOINTMENT (OUTPATIENT)
Dept: RHEUMATOLOGY | Facility: CLINIC | Age: 57
End: 2022-07-20

## 2022-07-20 VITALS
BODY MASS INDEX: 21.6 KG/M2 | SYSTOLIC BLOOD PRESSURE: 121 MMHG | HEIGHT: 60 IN | WEIGHT: 110 LBS | TEMPERATURE: 97.5 F | OXYGEN SATURATION: 100 % | DIASTOLIC BLOOD PRESSURE: 69 MMHG | HEART RATE: 85 BPM

## 2022-07-20 DIAGNOSIS — M15.8 OTHER POLYOSTEOARTHRITIS: ICD-10-CM

## 2022-07-20 PROCEDURE — 99214 OFFICE O/P EST MOD 30 MIN: CPT

## 2022-07-20 RX ORDER — NAPROXEN 500 MG/1
500 TABLET ORAL
Qty: 60 | Refills: 0 | Status: ACTIVE | COMMUNITY
Start: 2020-12-23 | End: 1900-01-01

## 2022-07-20 NOTE — PHYSICAL EXAM
[General Appearance - Well Nourished] : well nourished [General Appearance - Well Developed] : well developed [Sclera] : the sclera and conjunctiva were normal [Hearing Threshold Finger Rub Not [place]] : hearing was normal [Nail Clubbing] : no clubbing  or cyanosis of the fingernails [Motor Tone] : muscle strength and tone were normal [FreeTextEntry1] : patient with right 2nd pip pain and deformity, unable to bend at this pip, right 1st IP also tender to palpation swollen and unable to bent, 3th pip pain but no swelling, left 5th dip [place] nodes.    [] : no rash [Skin Lesions] : no skin lesions [Affect] : the affect was normal [Mood] : the mood was normal

## 2022-07-20 NOTE — ASSESSMENT
[FreeTextEntry1] : 57 yo woman with hand OA with some inflammatory component.  we have tried prednisone, plaquenil ,and  OT  with little improvement.  Injection with mild improvement.  we started cymbalta 60mg with  improvement \par \par \par --continue Cymbalta to 60mg \par --she cont naproxen with omeprazole as needed \par --will consider getting new xrays on next visit \par --she is encouraged to do hand exercises/ use paraffin baths \par --we have also discuss using MTX off label.  at present still hesitant \par \par

## 2022-12-05 ENCOUNTER — RX RENEWAL (OUTPATIENT)
Age: 57
End: 2022-12-05

## 2022-12-05 RX ORDER — OMEPRAZOLE 20 MG/1
20 CAPSULE, DELAYED RELEASE ORAL DAILY
Qty: 30 | Refills: 0 | Status: ACTIVE | COMMUNITY
Start: 2021-05-12 | End: 1900-01-01

## 2022-12-05 RX ORDER — DULOXETINE HYDROCHLORIDE 60 MG/1
60 CAPSULE, DELAYED RELEASE PELLETS ORAL
Qty: 30 | Refills: 0 | Status: ACTIVE | COMMUNITY
Start: 2021-05-12 | End: 1900-01-01

## 2023-01-04 ENCOUNTER — APPOINTMENT (OUTPATIENT)
Dept: RHEUMATOLOGY | Facility: CLINIC | Age: 58
End: 2023-01-04

## 2023-01-11 ENCOUNTER — APPOINTMENT (OUTPATIENT)
Dept: RHEUMATOLOGY | Facility: CLINIC | Age: 58
End: 2023-01-11

## 2023-02-15 ENCOUNTER — APPOINTMENT (OUTPATIENT)
Dept: RHEUMATOLOGY | Facility: CLINIC | Age: 58
End: 2023-02-15